# Patient Record
Sex: FEMALE | Race: WHITE | Employment: UNEMPLOYED | URBAN - METROPOLITAN AREA
[De-identification: names, ages, dates, MRNs, and addresses within clinical notes are randomized per-mention and may not be internally consistent; named-entity substitution may affect disease eponyms.]

---

## 2017-01-23 ENCOUNTER — OFFICE VISIT (OUTPATIENT)
Dept: INTERNAL MEDICINE | Facility: CLINIC | Age: 52
End: 2017-01-23
Payer: COMMERCIAL

## 2017-01-23 VITALS
SYSTOLIC BLOOD PRESSURE: 122 MMHG | TEMPERATURE: 97.9 F | DIASTOLIC BLOOD PRESSURE: 78 MMHG | HEART RATE: 91 BPM | WEIGHT: 167.4 LBS | BODY MASS INDEX: 35.14 KG/M2 | HEIGHT: 58 IN | OXYGEN SATURATION: 98 %

## 2017-01-23 DIAGNOSIS — M54.9 UPPER BACK PAIN: ICD-10-CM

## 2017-01-23 DIAGNOSIS — R73.09 ELEVATED GLUCOSE LEVEL: ICD-10-CM

## 2017-01-23 DIAGNOSIS — R79.89 ELEVATED LFTS: Primary | ICD-10-CM

## 2017-01-23 DIAGNOSIS — E78.5 HYPERLIPIDEMIA LDL GOAL <130: ICD-10-CM

## 2017-01-23 DIAGNOSIS — Z13.29 SCREENING FOR THYROID DISORDER: ICD-10-CM

## 2017-01-23 DIAGNOSIS — D17.30 LIPOMA OF SKIN AND SUBCUTANEOUS TISSUE: ICD-10-CM

## 2017-01-23 DIAGNOSIS — E66.9 OBESITY (BMI 30-39.9): ICD-10-CM

## 2017-01-23 LAB
ALBUMIN SERPL-MCNC: 3.7 G/DL (ref 3.4–5)
ALP SERPL-CCNC: 121 U/L (ref 40–150)
ALT SERPL W P-5'-P-CCNC: 94 U/L (ref 0–50)
ANION GAP SERPL CALCULATED.3IONS-SCNC: 8 MMOL/L (ref 3–14)
AST SERPL W P-5'-P-CCNC: 66 U/L (ref 0–45)
BASOPHILS # BLD AUTO: 0 10E9/L (ref 0–0.2)
BASOPHILS NFR BLD AUTO: 0.3 %
BILIRUB SERPL-MCNC: 0.2 MG/DL (ref 0.2–1.3)
BUN SERPL-MCNC: 11 MG/DL (ref 7–30)
CALCIUM SERPL-MCNC: 8.8 MG/DL (ref 8.5–10.1)
CHLORIDE SERPL-SCNC: 106 MMOL/L (ref 94–109)
CO2 SERPL-SCNC: 27 MMOL/L (ref 20–32)
CREAT SERPL-MCNC: 0.58 MG/DL (ref 0.52–1.04)
DIFFERENTIAL METHOD BLD: NORMAL
EOSINOPHIL # BLD AUTO: 0.2 10E9/L (ref 0–0.7)
EOSINOPHIL NFR BLD AUTO: 2.9 %
ERYTHROCYTE [DISTWIDTH] IN BLOOD BY AUTOMATED COUNT: 13.3 % (ref 10–15)
FERRITIN SERPL-MCNC: 230 NG/ML (ref 8–252)
GFR SERPL CREATININE-BSD FRML MDRD: ABNORMAL ML/MIN/1.7M2
GLUCOSE SERPL-MCNC: 127 MG/DL (ref 70–99)
HCT VFR BLD AUTO: 39.8 % (ref 35–47)
HGB BLD-MCNC: 13.1 G/DL (ref 11.7–15.7)
LIPASE SERPL-CCNC: 193 U/L (ref 73–393)
LYMPHOCYTES # BLD AUTO: 2.5 10E9/L (ref 0.8–5.3)
LYMPHOCYTES NFR BLD AUTO: 37.5 %
MCH RBC QN AUTO: 27.3 PG (ref 26.5–33)
MCHC RBC AUTO-ENTMCNC: 32.9 G/DL (ref 31.5–36.5)
MCV RBC AUTO: 83 FL (ref 78–100)
MONOCYTES # BLD AUTO: 0.4 10E9/L (ref 0–1.3)
MONOCYTES NFR BLD AUTO: 6.4 %
NEUTROPHILS # BLD AUTO: 3.5 10E9/L (ref 1.6–8.3)
NEUTROPHILS NFR BLD AUTO: 52.9 %
PLATELET # BLD AUTO: 270 10E9/L (ref 150–450)
POTASSIUM SERPL-SCNC: 3.6 MMOL/L (ref 3.4–5.3)
PROT SERPL-MCNC: 7.9 G/DL (ref 6.8–8.8)
RBC # BLD AUTO: 4.79 10E12/L (ref 3.8–5.2)
SODIUM SERPL-SCNC: 141 MMOL/L (ref 133–144)
TSH SERPL DL<=0.005 MIU/L-ACNC: 2.57 MU/L (ref 0.4–4)
WBC # BLD AUTO: 6.5 10E9/L (ref 4–11)

## 2017-01-23 PROCEDURE — 80050 GENERAL HEALTH PANEL: CPT | Performed by: INTERNAL MEDICINE

## 2017-01-23 PROCEDURE — 87340 HEPATITIS B SURFACE AG IA: CPT | Performed by: INTERNAL MEDICINE

## 2017-01-23 PROCEDURE — 82728 ASSAY OF FERRITIN: CPT | Performed by: INTERNAL MEDICINE

## 2017-01-23 PROCEDURE — 83690 ASSAY OF LIPASE: CPT | Performed by: INTERNAL MEDICINE

## 2017-01-23 PROCEDURE — 36415 COLL VENOUS BLD VENIPUNCTURE: CPT | Performed by: INTERNAL MEDICINE

## 2017-01-23 PROCEDURE — 86803 HEPATITIS C AB TEST: CPT | Performed by: INTERNAL MEDICINE

## 2017-01-23 PROCEDURE — 99214 OFFICE O/P EST MOD 30 MIN: CPT | Performed by: INTERNAL MEDICINE

## 2017-01-23 NOTE — Clinical Note
[facility][1]  [address]  [address]  [phone]          Randa Islastishoskar  [address]  [facility] [address]      1/25/2017        Dear Ms. Randa Castrejon:    I am writing to inform you of the results of the laboratory tests you had done recently.    The results of your recent labs are as noted.   Liver function: ABNORMAL liver ezymes, but better than last time.    Kidney  function: NORMAL  Hemoglobin: NORMAL  Electrolytes: NORMAL  Glucose: ABNORMAL @ 125 (this would be normal if you had eaten any foods within 4 hours of the lab test)  Hepatitis B:  NEGATIVE  Hepatitis C: NEGATIVE    The liver enzyme tests were still slightly elevated, but not as high as before.  The remainder of the liver tests were normal.  There was no evidence for Hepatitis B or Hepatitis C.  I am suspect that this liver enzyme elevation is due to dietary causes, specifically caused by intake of too many carbohydrates, like any foods made from flour (Bread, tortilla, pasta, noodles), white rice, potatoes, snack foods, regular soda, juices (except fresh squeezed), cakes, cookies, candy, etc.)   The glucose test was also slightly elevated, but any food within 4 hours of the blood test could cause this.   The pancreas, thyroid, kidneys, and blood counts were all OK.   The only thing you need to do is to reduce the intake of carbohydrates as we discussed in your appointment.  This will help lower glucose levels, reduce weight, and  Help improve the liver tests.     Return to see me in approximately summer 2017, sooner if needed.  Please get [facility] 1-2 days before this appointment.  Eat nothing for at least 8 hours prior to having these labs drawn.  Call [phone] to schedule both appointments.       Thank you for allowing me to participate in your care. If you have any further questions or problems, please contact me via our nurse line at  [phone].    Sincerely,          Des Aquino M.D.  Department of Internal Medicine  [facility]

## 2017-01-23 NOTE — PROGRESS NOTES
SUBJECTIVE:                                                    Randa Castrejon is a 51 year old female who presents to clinic today for the following health issues:      The patient does not speak English as their primary Language.  The patient was seen today via an interpretor.       1.  F/u liver labs, abnoremal LFTS last year.   deneis alcohol use.   Does admit to eating a fair amount of carbs in her diet.     Concern - Fall     Onset: 5 days ago     Description:   Pt fell on ice and hit head, bump on head near R temple    Intensity: mild    Progression of Symptoms:  improving    Accompanying Signs & Symptoms:  Pt still has some pain in back occ.        Previous history of similar problem:       Precipitating factors:   Worsened by:     Alleviating factors:  Improved by: massage, and ice       Therapies Tried and outcome: ice and massage w/ relief          Problem list and histories reviewed & adjusted, as indicated.  Additional history: as documented        Past Medical History:  ---------------------------  Past Medical History   Diagnosis Date     Obesity 3/9/15     BMI 34     Hyperlipidemia LDL goal <130 3/9/15     mild triglyceride and LDL elevation       Past Surgical History:  ---------------------------  Past Surgical History   Procedure Laterality Date     Release carpal tunnel  2004       Current Medications:  ---------------------------  No current outpatient prescriptions on file.       Allergies:  -------------  Allergies   Allergen Reactions     Advil [Ibuprofen] Nausea     Numbness in face      No Clinical Screening - See Comments        dye      Sulfa Drugs Rash     Numbness in face        Social History:  -------------------  Social History     Social History     Marital Status:      Spouse Name: N/A     Number of Children: N/A     Years of Education: N/A     Occupational History     Not on file.     Social History Main Topics     Smoking status: Never Smoker      Smokeless  "tobacco: Never Used     Alcohol Use: No     Drug Use: No     Sexual Activity:     Partners: Male     Other Topics Concern     Not on file     Social History Narrative       Family Medical History:  ------------------------------  Family History   Problem Relation Age of Onset     DIABETES Mother      Hypertension Mother      DIABETES Father      DIABETES Sister      Hypertension Sister      Hypertension Son          ROS:  REVIEW OF SYSTEMS:    RESP: negative for cough, dyspnea, wheezing, hemoptysis  CV: negative for chest pain, palpitations, PND, LE, orthopnea; reports no changes in their ability to perform physical activity (from cardiovascular standpoint)  GI: negative for dysphagia, N/V, pain, melena, diarrhea and constipation  NEURO: negative for numbness/tingling, paralysis, incoordination, or focal weakness     OBJECTIVE:                                                    /78 mmHg  Pulse 91  Temp(Src) 97.9  F (36.6  C) (Oral)  Ht 4' 9.5\" (1.461 m)  Wt 167 lb 6.4 oz (75.932 kg)  BMI 35.57 kg/m2  SpO2 98%     GENERAL alert and no distress  EYES:  Normal sclera,conjunctiva, EOMI  HENT: oral and posterior pharynx without lesions or erythema, facies symmetric  NECK: Neck supple. No LAD, without thyroidmegaly or JVD., Carotids without bruits.  RESP: Clear to ausculation bilaterally without wheezes or crackles. Normal BS in all fields.  CV: RRR normal S1S2 without murmurs, rubs or gallops. PMI normal  LYMPH: no cervical lymph adenopathy appreciated  MS: extremities- no gross deformities of the visible extremities noted, no edema  PSYCH: Alert and oriented times 3; speech- coherent  BACK:  Pain to palpation of the muscle of the upper back, particularly the rhomboids.  The muscles in the upper and mid back are in spasm and tight.  Palpation here exactly reproduces their pain.  No pain palpation of the vertebral bodies themselves.   SKIN:  No obvious significant skin lesions on visible portions of face, " rubbery mobiel soft tissue mass in soft tissues on forehead         ASSESSMENT/PLAN:                                                      (R79.89) Elevated LFTs  (primary encounter diagnosis)  Comment: The patient's liver function tests are higher than expected.    Discussed reasons for elevated LFTs including side effects from medications (e.g. Statins, tylenol, some anti seizure medications, allopurinol, etc.), alcoholic hepatitis, other street drugs, viral hepatitis, other non hepatic vial infections (e.g. Mononucleosis), cirrhosis, metabolic accumulative disorders (e.g. Hemochromatosis), or metabolic disorders (e.g. KOENIG).    Discussed next level testing and possible imaging.   Recommended that they stop any and all alcohol use and to avoid Tylenol as much as possible.     Plan: CBC with platelets and differential,         Comprehensive metabolic panel (BMP + Alb, Alk         Phos, ALT, AST, Total. Bili, TP), Lipase            (M54.9) Upper back pain  Comment: Upper back pain with fair amount of myofascial pain.  No evidence for spinal or vertebral pathology.  No imaging indicated at this time.   Discussed the typical causes for these pains and recommended proper ergonomics and body mechanical issues.   Recommended moist heat, NSAIDs if able to tolerate, stretching, massage/trigger point release, and physical therapy if the patient desires.  Told the patient to return if there are any changes in the symptoms worsen, change in any way, or fail to respond to these conservative measures.     Plan:     (Z13.29) Screening for thyroid disorder  Comment:   Plan: TSH with free T4 reflex              See Patient Instructions    *  See Dermatology about the fatty mass in the right forehead, which is probably just a simple lipoma, a bening soft tissue fatty mass.      *  Last year, the liver tests were very mildly elevated, but not enough to be of concern.  The most common reasons for this would be any alcohol and/or  tylenol consumed within 48 hours of the lab draw, certain anti-inflammatory medications (such as Aleve), statin cholesterol lowering medications, overweight/obesity, high carbohydrate diets, and also sometimes as a response to certain upper respiratory viral infections.  These liver tests would be expected to normalize if it was due to alcohol, tylenol, or Aleve; no further workup required at this time.  That being said, please be sure to avoid excessive alcohol consumption (no more than 2 drinks per day) and keep acetaminophen (Tylenol) use within recommended guidelines as listed on the bottle.     *  Rechecking the liver test today.  i will let you know if we have to do anything more than watch what you eat.     *  Return to see me in 6 months, sooner if needed.  Please get fasting labs done at the [facility] or any other [facility] lab 1-2 days before this appointment.  If you get the labs done at another clinic, make arrangements with them directly.  The orders will be in place.  Eat nothing for at least 8 hours prior to having these labs drawn.  Call [phone] to schedule appointments at [facility].       ADRIEL VASQUEZ M.D., MD  [facility]

## 2017-01-23 NOTE — MR AVS SNAPSHOT
After Visit Summary   1/23/2017    Randa Castrejon    MRN: [id number]           Patient Information     Date Of Birth          1965        Visit Information        Provider Department      1/23/2017 1:15 PM Des Aquino MD; [employer] [facility]        Today's Diagnoses     Elevated LFTs    -  1     Upper back pain         Screening for thyroid disorder         Lipoma of skin and subcutaneous tissue         Elevated glucose level         Obesity (BMI 30-39.9)         Hyperlipidemia LDL goal <130           Care Instructions    *  See Dermatology about the fatty mass in the right forehead, which is probably just a simple lipoma, a bening soft tissue fatty mass.      *  Last year, the liver tests were very mildly elevated, but not enough to be of concern.  The most common reasons for this would be any alcohol and/or tylenol consumed within 48 hours of the lab draw, certain anti-inflammatory medications (such as Aleve), statin cholesterol lowering medications, overweight/obesity, high carbohydrate diets, and also sometimes as a response to certain upper respiratory viral infections.  These liver tests would be expected to normalize if it was due to alcohol, tylenol, or Aleve; no further workup required at this time.  That being said, please be sure to avoid excessive alcohol consumption (no more than 2 drinks per day) and keep acetaminophen (Tylenol) use within recommended guidelines as listed on the bottle.     *  Rechecking the liver test today.  i will let you know if we have to do anything more than watch what you eat.     *  Return to see me in 6 months, sooner if needed.  Please get fasting labs done at the [facility] or any other [facility] lab 1-2 days before this appointment.  If you get the labs done at another clinic, make arrangements with them directly.  The orders will be in place.  Eat nothing for at  "least 8 hours prior to having these labs drawn.  Call [phone] to schedule appointments at [facility].       PLANTAR FASCIITIS:     *  Irritation/inflammation of the tendons of the arch of your foot at the insertion on to the insertion on your heel.      *  This is a problem of improper foot mechanics. Correcting these mechanical features will help.    *  Avoid any shoes that cause  pain or problems.  *  Never walk around in bare feet.    *  Over the counter orthotics/heel lifts/arch supports.  *  Custom orthotics can be made at an orthotic lab if needed, but these can be expensive.  Call me if you would like a referral for this.   *  Stretch the achilles tendons, plantar fascia many times per day.  *  Use Ibuprofen or similar products as needed.  *  Massage the plantar fascia with a golf ball or similar device, or thumbs before getting out of bed.  *  Plantar fascia program through [facility] if you desire, call me if you desire a referral here  *  Also go to www.tptherapy.com and consider some of the massage tools for the feet/lower extremities to help work on the soft tissues that can relieve the symptoms of plantar fasciitis.         *  \"Jack \" massage tool (or similar product) to massage the arch of your foot to break up the inflammatory tissue, especially in the morning.  (Google \"jack  massage tool or visit the web site:  http://www.[employer]/products/Massage-Tool/Pressure-Positive-Jack--II/765/1/1)               --Good Grains:  Oats, brown rice, Quinoa (these do not raise the blood sugar as much)     --Bad grains:  Anything made from wheat or white rice     (because these raise the blood sugars significantly, and the possible gluten issue from wheat for some people).      --Proteins:  Aim for \"lean proteins\" including chicken, fish, seafood, pork, turkey, and eggs (in moderation); Eat red meat only occasionally          *  OBESITY/WEIGHT " "LOSS:  ====================================================    *  Obesity is a major problem in this country.  Over 2/3 of adult Americans are overweight (BMI Over 25), and 1/3 are obese (BMI over 30)    *  Obesity is the leading cause of diabetes type II, which currently affects 1 out of 10 adult Americans and is projected to potentially affect 1 out of 3 adult Americans by 2040    *  Chronic obesity leads to \"insulin resistance\" which affects the carbohydrate (sugar) metabolism causing \"diabetisy\" which leads to type II Diabetes, increased visceral fat, a chronic low grade inflammatory state that leads to higher rates of vascular disease among other things.     *  Obesity is defined as BMI (body mass index) greater than 30.    *  Morbid obesity is defined as BMI over 40    Your most recent Body mass index is:  Body mass index is 35.57 kg/(m^2).    The main principles in weight loss are diet and exercise. You need to have both.      Dietary principles are aimed at keeping the fat content in your diet to less than 30% of total calories AND minimizing the simple carbohydrates (breads, sugars, pasta, etc.).  Complex carbohydrates such as wild rice, brown rice, legumes and most vegetables are OK.    *  Think \"Eat like a caveman\", eat \"primitive\" foods.    *  Keep your food shopping to the outside of the grocery store, do not eat foods that come from a bag or box, do not eat foods with bar codes.    *  Use the fork rule for all eating. [If you can't pick food up with a fork, then the food will not turn off hunger very well. Using this rule helps patients avoid choosing the wrong types of food, especially if you have had a bariatric surgery operation.   The \"wrong foods\" include liquid calories such as soup, juice, soda, slimfast, ice cream, and beer, crumbly foods such as chips, crackers, and cookies, and starch based foods such as pasta, too much rice, and too much bread.]     * Keep your calorie intake at least less " "than 1500 per day to start (under 1300 total if you want to be more aggressive), divided between 3 meals (try to have no meal more than 500 calories) and 2 snacks.      *  \"Learn what 500 calories looks like\" and try to keep all meals under 500 calories.      *  Drink one large glass of water BEFORE each meal.  This not only helps guard against dehydration, but it also helps provide additional \"fullness\" that will help reduce the amount of food that you will consume in a given meal by helping you fill up earlier.      *  Figure out what kind of calories you are taking in now at this time.  It is hard to change behaviors that you do not understand.      *  Eat breakfast every day.  Skipping meals has been shown to be one of the factors that correlates the highest with obesity, (even more than fast food).  Try to avoid the typical carbohydrates and sugars that dominate the typical American breakfast.  Try to get more protein in earlier in the day, this will make you less hungry later.       *  Try High Protein Ensure or Boost nutritional supplements (or similar versions) for breakfast if nothing else.      *  Avoid \"manufactured foods\" as much as possible.  My definition of a \"manufactured\" or \"processed\" food is any single food product that has more than 6 ingredients.     *  Keep your grocery shopping to the \"outside\" of the grocery store, this is where all the \"real food\" is located.      *  Try to limit all simple carbohydrate foods such as white breads (whole grain natural breads are OK), white rice (brown rice and wild rice are OK), pasta, noodles, \"snack foods\", candy, jam/jellies, cakes, pies, sweets, regular soda (sugar free is OK).    *  Limit the amount of citrus fruits such as oranges, julia, grapefruit.  These contain a large amount of sugars and will raise your blood sugars very high.  Try to keep less than 3 pieces of fruit per day.  Grapefruits specifically can interfere with the metabolism of " "\"statin\" cholesterol lowering drugs and therefore should be avoided completely if you are on a statin medication.      *  Always eat three meals a day every day:  breakfast, lunch, and supper.    *  I do not recommend over the counter diet aids such as Metabolife or Dexatrim since they have a high risk of side effects mainly fast heart rate, insomnia, and nervousness.    *  Very hard to lose weight with diet changes alone.  You need to have regular exercise.  You should aim for either 3 hours per week total of cumulative physical aerobic activity or 10,000 steps per day of activity.  Buy a pedometer and keep track of your activity.  If your typical daily activity does not add up to 10,000 total steps, then make up the difference with walking or some sort of aerobic activity.          Good resources for nutrition are:    ---> \"Wheat Belly\" by Quinten Samuel  (a book about the many bad things processed wheat products (i.e. Bread) have caused in our country...which I believe has serious merit)       --->  \"The Paleo Diet\"  By Alissa Douglas.             --->\"In Defense of Food\"  Of \"Food Rules\" (Gregorio Starkey) a book that goes into the causes obesity epidemic in our country    Augustin Starkey:                    ---> \"Picture Perfect Weight Loss\" by Clemente Webster (another good book about choices)        ---> \"Eat This, Not That\" Series,  by Brodie Torrez  (a book about food choices in the modern world)              ---> \"The Blood Sugar Solution\" by Davon Tellez ( a book about obesity and insulin resistance leading to \"diabesity\")           Fauzia Arias ( a guidebook for counting calories, and knowing the components of the food that you eat)       \"The Best Life Diet\"  (a complete diet program)             Aim for a Healthy Weight Web Page at www.nhlbi.nih.gov       [place] Diet       Liborio Brambila:  \"Eat More, Weigh Less\" or \"The Program for the Reversal of Heart Disease\"       [facility] web site  the food and " nutrition link.       American Heart Association       American Diabetes Association (www.diabetes.org)                         Follow-ups after your visit        Additional Services     DERMATOLOGY REFERRAL       Your provider has referred you to: KEHINDE: [facility] [phone]   [email or web address]    Please be aware that coverage of these services is subject to the terms and limitations of your health insurance plan.  Call member services at your health plan with any benefit or coverage questions.      Please bring the following with you to your appointment:    (1) Any X-Rays, CTs or MRIs which have been performed.  Contact the facility where they were done to arrange for  prior to your scheduled appointment.    (2) List of current medications  (3) This referral request   (4) Any documents/labs given to you for this referral                  Future tests that were ordered for you today     Open Future Orders        Priority Expected Expires Ordered    Comprehensive metabolic panel (BMP + Alb, Alk Phos, ALT, AST, Total. Bili, TP) Routine 7/23/2017 9/23/2017 1/23/2017    Hemoglobin A1c Routine 7/23/2017 9/23/2017 1/23/2017    Lipid Profile with reflex to direct LDL Routine 7/23/2017 9/23/2017 1/23/2017            Who to contact     If you have questions or need follow up information about today's clinic visit or your schedule please contact [facility] directly at [phone].  Normal or non-critical lab and imaging results will be communicated to you by MyChart, letter or phone within 4 business days after the clinic has received the results. If you do not hear from us within 7 days, please contact the clinic through MyChart or phone. If you have a critical or abnormal lab result, we will notify you by phone as soon as possible.  Submit refill requests through [facility] or call your pharmacy and they will forward the refill  "request to us. Please allow 3 business days for your refill to be completed.          Additional Information About Your Visit        [employer] Information     [employer] gives you secure access to your electronic health record. If you see a primary care provider, you can also send messages to your care team and make appointments. If you have questions, please call your primary care clinic.  If you do not have a primary care provider, please call [phone] and they will assist you.        Care EveryWhere ID     This is your Care EveryWhere ID. This could be used by other organizations to access your [place] medical records  [id number]        Your Vitals Were     Pulse Temperature Height BMI (Body Mass Index) Pulse Oximetry       91 97.9  F (36.6  C) (Oral) 4' 9.5\" (1.461 m) 35.57 kg/m2 98%        Blood Pressure from Last 3 Encounters:   01/23/17 122/78   05/23/16 140/76   05/06/16 108/80    Weight from Last 3 Encounters:   01/23/17 167 lb 6.4 oz (75.932 kg)   05/23/16 169 lb 8 oz (76.885 kg)   05/06/16 164 lb (74.39 kg)              We Performed the Following     CBC with platelets and differential     Comprehensive metabolic panel (BMP + Alb, Alk Phos, ALT, AST, Total. Bili, TP)     DERMATOLOGY REFERRAL     Ferritin     Hepatitis B surface antigen     Hepatitis C Screen Reflex to HCV RNA Quant and Genotype     Lipase     TSH with free T4 reflex        Primary Care Provider Office Phone # Fax #    Des Aquino [phone]       [facility] [address] [place] [id number]        Thank you!     Thank you for choosing [facility]  for your care. Our goal is always to provide you with excellent care. Hearing back from our patients is one way we can continue to improve our services. Please take a few minutes to complete the written survey that you may receive in the mail after your visit with us. Thank you!             Your Updated Medication List - " Protect others around you: Learn how to safely use, store and throw away your medicines at www.disposemymeds.org.      Notice  As of 1/23/2017  2:39 PM    You have not been prescribed any medications.

## 2017-01-23 NOTE — PATIENT INSTRUCTIONS
*  See Dermatology about the fatty mass in the right forehead, which is probably just a simple lipoma, a bening soft tissue fatty mass.      *  Last year, the liver tests were very mildly elevated, but not enough to be of concern.  The most common reasons for this would be any alcohol and/or tylenol consumed within 48 hours of the lab draw, certain anti-inflammatory medications (such as Aleve), statin cholesterol lowering medications, overweight/obesity, high carbohydrate diets, and also sometimes as a response to certain upper respiratory viral infections.  These liver tests would be expected to normalize if it was due to alcohol, tylenol, or Aleve; no further workup required at this time.  That being said, please be sure to avoid excessive alcohol consumption (no more than 2 drinks per day) and keep acetaminophen (Tylenol) use within recommended guidelines as listed on the bottle.     *  Rechecking the liver test today.  i will let you know if we have to do anything more than watch what you eat.     *  Return to see me in 6 months, sooner if needed.  Please get fasting labs done at the [facility] or any other [facility] lab 1-2 days before this appointment.  If you get the labs done at another clinic, make arrangements with them directly.  The orders will be in place.  Eat nothing for at least 8 hours prior to having these labs drawn.  Call [phone] to schedule appointments at [facility].       PLANTAR FASCIITIS:     *  Irritation/inflammation of the tendons of the arch of your foot at the insertion on to the insertion on your heel.      *  This is a problem of improper foot mechanics. Correcting these mechanical features will help.    *  Avoid any shoes that cause  pain or problems.  *  Never walk around in bare feet.    *  Over the counter orthotics/heel lifts/arch supports.  *  Custom orthotics can be made at an orthotic lab if needed, but these can be expensive.  Call me if you  "would like a referral for this.   *  Stretch the achilles tendons, plantar fascia many times per day.  *  Use Ibuprofen or similar products as needed.  *  Massage the plantar fascia with a golf ball or similar device, or thumbs before getting out of bed.  *  Plantar fascia program through [facility] if you desire, call me if you desire a referral here  *  Also go to www.tptherapy.com and consider some of the massage tools for the feet/lower extremities to help work on the soft tissues that can relieve the symptoms of plantar fasciitis.         *  \"Jack \" massage tool (or similar product) to massage the arch of your foot to break up the inflammatory tissue, especially in the morning.  (Google \"jack  massage tool or visit the web site:  http://www.[employer]/products/Massage-Tool/Pressure-Positive-Jack--II/765/1/1)               --Good Grains:  Oats, brown rice, Quinoa (these do not raise the blood sugar as much)     --Bad grains:  Anything made from wheat or white rice     (because these raise the blood sugars significantly, and the possible gluten issue from wheat for some people).      --Proteins:  Aim for \"lean proteins\" including chicken, fish, seafood, pork, turkey, and eggs (in moderation); Eat red meat only occasionally          *  OBESITY/WEIGHT LOSS:  ====================================================    *  Obesity is a major problem in this [place].  Over 2/3 of adult Americans are overweight (BMI Over 25), and 1/3 are obese (BMI over 30)    *  Obesity is the leading cause of diabetes type II, which currently affects 1 out of 10 adult Americans and is projected to potentially affect 1 out of 3 adult Americans by 2040    *  Chronic obesity leads to \"insulin resistance\" which affects the carbohydrate (sugar) metabolism causing \"diabetisy\" which leads to type II Diabetes, increased visceral fat, a chronic low grade inflammatory state that leads to higher rates of " "vascular disease among other things.     *  Obesity is defined as BMI (body mass index) greater than 30.    *  Morbid obesity is defined as BMI over 40    Your most recent Body mass index is:  Body mass index is 35.57 kg/(m^2).    The main principles in weight loss are diet and exercise. You need to have both.      Dietary principles are aimed at keeping the fat content in your diet to less than 30% of total calories AND minimizing the simple carbohydrates (breads, sugars, pasta, etc.).  Complex carbohydrates such as wild rice, brown rice, legumes and most vegetables are OK.    *  Think \"Eat like a caveman\", eat \"primitive\" foods.    *  Keep your food shopping to the outside of the grocery store, do not eat foods that come from a bag or box, do not eat foods with bar codes.    *  Use the fork rule for all eating. [If you can't pick food up with a fork, then the food will not turn off hunger very well. Using this rule helps patients avoid choosing the wrong types of food, especially if you have had a bariatric surgery operation.   The \"wrong foods\" include liquid calories such as soup, juice, soda, slimfast, ice cream, and beer, crumbly foods such as chips, crackers, and cookies, and starch based foods such as pasta, too much rice, and too much bread.]     * Keep your calorie intake at least less than 1500 per day to start (under 1300 total if you want to be more aggressive), divided between 3 meals (try to have no meal more than 500 calories) and 2 snacks.      *  \"Learn what 500 calories looks like\" and try to keep all meals under 500 calories.      *  Drink one large glass of water BEFORE each meal.  This not only helps guard against dehydration, but it also helps provide additional \"fullness\" that will help reduce the amount of food that you will consume in a given meal by helping you fill up earlier.      *  Figure out what kind of calories you are taking in now at this time.  It is hard to change behaviors that " "you do not understand.      *  Eat breakfast every day.  Skipping meals has been shown to be one of the factors that correlates the highest with obesity, (even more than fast food).  Try to avoid the typical carbohydrates and sugars that dominate the typical American breakfast.  Try to get more protein in earlier in the day, this will make you less hungry later.       *  Try High Protein Ensure or Boost nutritional supplements (or similar versions) for breakfast if nothing else.      *  Avoid \"manufactured foods\" as much as possible.  My definition of a \"manufactured\" or \"processed\" food is any single food product that has more than 6 ingredients.     *  Keep your grocery shopping to the \"outside\" of the grocery store, this is where all the \"real food\" is located.      *  Try to limit all simple carbohydrate foods such as white breads (whole grain natural breads are OK), white rice (brown rice and wild rice are OK), pasta, noodles, \"snack foods\", candy, jam/jellies, cakes, pies, sweets, regular soda (sugar free is OK).    *  Limit the amount of citrus fruits such as oranges, julia, grapefruit.  These contain a large amount of sugars and will raise your blood sugars very high.  Try to keep less than 3 pieces of fruit per day.  Grapefruits specifically can interfere with the metabolism of \"statin\" cholesterol lowering drugs and therefore should be avoided completely if you are on a statin medication.      *  Always eat three meals a day every day:  breakfast, lunch, and supper.    *  I do not recommend over the counter diet aids such as Metabolife or Dexatrim since they have a high risk of side effects mainly fast heart rate, insomnia, and nervousness.    *  Very hard to lose weight with diet changes alone.  You need to have regular exercise.  You should aim for either 3 hours per week total of cumulative physical aerobic activity or 10,000 steps per day of activity.  Buy a pedometer and keep track of your activity.  " "If your typical daily activity does not add up to 10,000 total steps, then make up the difference with walking or some sort of aerobic activity.          Good resources for nutrition are:    ---> \"Wheat Belly\" by Quinten Samuel  (a book about the many bad things processed wheat products (i.e. Bread) have caused in our country...which I believe has serious merit)       --->  \"The Paleo Diet\"  By Alissa Douglas.             --->\"In Defense of Food\"  Of \"Food Rules\" (Gregorio Starkey) a book that goes into the causes obesity epidemic in our country    Augustin Starkey:                    ---> \"Picture Perfect Weight Loss\" by Clemente Webster (another good book about choices)        ---> \"Eat This, Not That\" Series,  by Brodie Torrez  (a book about food choices in the modern world)              ---> \"The Blood Sugar Solution\" by Davon Tellez ( a book about obesity and insulin resistance leading to \"diabesity\")           Fauzia Arias ( a guidebook for counting calories, and knowing the components of the food that you eat)       \"The Best Life Diet\"  (a complete diet program)             Aim for a Healthy Weight Web Page at www.nhlbi.nih.gov       [place] Diet       Liborio Brambila:  \"Eat More, Weigh Less\" or \"The Program for the Reversal of Heart Disease\"       [facility] web site  the food and nutrition link.       American Heart Association       American Diabetes Association (www.diabetes.org)                   "

## 2017-01-23 NOTE — NURSING NOTE
"Chief Complaint   Patient presents with     Liver     f/u w/ labs     Fall     fell and hit head 5 fays ago       Initial /78 mmHg  Pulse 91  Temp(Src) 97.9  F (36.6  C) (Oral)  Ht 4' 9.5\" (1.461 m)  Wt 167 lb 6.4 oz (75.932 kg)  BMI 35.57 kg/m2  SpO2 98% Estimated body mass index is 35.57 kg/(m^2) as calculated from the following:    Height as of this encounter: 4' 9.5\" (1.461 m).    Weight as of this encounter: 167 lb 6.4 oz (75.932 kg).  BP completed using cuff size: desiree Pelletier CMA      "

## 2017-01-24 LAB
HBV SURFACE AG SERPL QL IA: NONREACTIVE
HCV AB SERPL QL IA: NORMAL

## 2017-05-30 ENCOUNTER — OFFICE VISIT (OUTPATIENT)
Dept: INTERNAL MEDICINE | Facility: CLINIC | Age: 52
End: 2017-05-30
Payer: COMMERCIAL

## 2017-05-30 VITALS
OXYGEN SATURATION: 99 % | SYSTOLIC BLOOD PRESSURE: 114 MMHG | DIASTOLIC BLOOD PRESSURE: 68 MMHG | HEIGHT: 58 IN | HEART RATE: 60 BPM | WEIGHT: 163 LBS | TEMPERATURE: 98.2 F | BODY MASS INDEX: 34.22 KG/M2

## 2017-05-30 DIAGNOSIS — E78.5 HYPERLIPIDEMIA LDL GOAL <130: ICD-10-CM

## 2017-05-30 DIAGNOSIS — Z13.29 SCREENING FOR THYROID DISORDER: ICD-10-CM

## 2017-05-30 DIAGNOSIS — E66.9 OBESITY (BMI 30-39.9): ICD-10-CM

## 2017-05-30 DIAGNOSIS — R73.09 ELEVATED GLUCOSE LEVEL: ICD-10-CM

## 2017-05-30 DIAGNOSIS — Z12.11 SCREENING FOR COLON CANCER: ICD-10-CM

## 2017-05-30 DIAGNOSIS — R79.89 ELEVATED LFTS: ICD-10-CM

## 2017-05-30 DIAGNOSIS — Z00.00 ROUTINE GENERAL MEDICAL EXAMINATION AT A HEALTH CARE FACILITY: Primary | ICD-10-CM

## 2017-05-30 DIAGNOSIS — Z12.31 VISIT FOR SCREENING MAMMOGRAM: ICD-10-CM

## 2017-05-30 DIAGNOSIS — R73.03 PREDIABETES: ICD-10-CM

## 2017-05-30 LAB
ALBUMIN SERPL-MCNC: 3.8 G/DL (ref 3.4–5)
ALP SERPL-CCNC: 112 U/L (ref 40–150)
ALT SERPL W P-5'-P-CCNC: 41 U/L (ref 0–50)
ANION GAP SERPL CALCULATED.3IONS-SCNC: 7 MMOL/L (ref 3–14)
AST SERPL W P-5'-P-CCNC: 27 U/L (ref 0–45)
BASOPHILS # BLD AUTO: 0 10E9/L (ref 0–0.2)
BASOPHILS NFR BLD AUTO: 0.3 %
BILIRUB SERPL-MCNC: 0.3 MG/DL (ref 0.2–1.3)
BUN SERPL-MCNC: 9 MG/DL (ref 7–30)
CALCIUM SERPL-MCNC: 8.9 MG/DL (ref 8.5–10.1)
CHLORIDE SERPL-SCNC: 106 MMOL/L (ref 94–109)
CHOLEST SERPL-MCNC: 227 MG/DL
CO2 SERPL-SCNC: 27 MMOL/L (ref 20–32)
CREAT SERPL-MCNC: 0.56 MG/DL (ref 0.52–1.04)
DIFFERENTIAL METHOD BLD: NORMAL
EOSINOPHIL # BLD AUTO: 0.2 10E9/L (ref 0–0.7)
EOSINOPHIL NFR BLD AUTO: 2.1 %
ERYTHROCYTE [DISTWIDTH] IN BLOOD BY AUTOMATED COUNT: 13.2 % (ref 10–15)
GFR SERPL CREATININE-BSD FRML MDRD: ABNORMAL ML/MIN/1.7M2
GLUCOSE SERPL-MCNC: 113 MG/DL (ref 70–99)
HBA1C MFR BLD: 6.3 % (ref 4.3–6)
HCT VFR BLD AUTO: 39.9 % (ref 35–47)
HDLC SERPL-MCNC: 35 MG/DL
HGB BLD-MCNC: 12.8 G/DL (ref 11.7–15.7)
LDLC SERPL CALC-MCNC: 156 MG/DL
LYMPHOCYTES # BLD AUTO: 2.3 10E9/L (ref 0.8–5.3)
LYMPHOCYTES NFR BLD AUTO: 29.7 %
MCH RBC QN AUTO: 26.9 PG (ref 26.5–33)
MCHC RBC AUTO-ENTMCNC: 32.1 G/DL (ref 31.5–36.5)
MCV RBC AUTO: 84 FL (ref 78–100)
MONOCYTES # BLD AUTO: 0.4 10E9/L (ref 0–1.3)
MONOCYTES NFR BLD AUTO: 5.2 %
NEUTROPHILS # BLD AUTO: 4.9 10E9/L (ref 1.6–8.3)
NEUTROPHILS NFR BLD AUTO: 62.7 %
NONHDLC SERPL-MCNC: 192 MG/DL
PLATELET # BLD AUTO: 229 10E9/L (ref 150–450)
POTASSIUM SERPL-SCNC: 4.3 MMOL/L (ref 3.4–5.3)
PROT SERPL-MCNC: 7.7 G/DL (ref 6.8–8.8)
RBC # BLD AUTO: 4.75 10E12/L (ref 3.8–5.2)
SODIUM SERPL-SCNC: 140 MMOL/L (ref 133–144)
TRIGL SERPL-MCNC: 180 MG/DL
TSH SERPL DL<=0.005 MIU/L-ACNC: 2.85 MU/L (ref 0.4–4)
WBC # BLD AUTO: 7.7 10E9/L (ref 4–11)

## 2017-05-30 PROCEDURE — 80061 LIPID PANEL: CPT | Performed by: INTERNAL MEDICINE

## 2017-05-30 PROCEDURE — 83036 HEMOGLOBIN GLYCOSYLATED A1C: CPT | Performed by: INTERNAL MEDICINE

## 2017-05-30 PROCEDURE — 36415 COLL VENOUS BLD VENIPUNCTURE: CPT | Performed by: INTERNAL MEDICINE

## 2017-05-30 PROCEDURE — 99396 PREV VISIT EST AGE 40-64: CPT | Performed by: INTERNAL MEDICINE

## 2017-05-30 PROCEDURE — 80050 GENERAL HEALTH PANEL: CPT | Performed by: INTERNAL MEDICINE

## 2017-05-30 NOTE — NURSING NOTE
"Chief Complaint   Patient presents with     Physical     pt is fasting       Initial /68  Pulse 60  Temp 98.2  F (36.8  C) (Oral)  Ht 4' 9.5\" (1.461 m)  Wt 163 lb (73.9 kg)  SpO2 99%  BMI 34.66 kg/m2 Estimated body mass index is 34.66 kg/(m^2) as calculated from the following:    Height as of this encounter: 4' 9.5\" (1.461 m).    Weight as of this encounter: 163 lb (73.9 kg).  Medication Reconciliation: complete   Leah Pelletier CMA    "

## 2017-05-30 NOTE — PATIENT INSTRUCTIONS
"  *  Work on your diet.     *  Mammogram at you r convenience.     *  Colon cancer screening:     --I would recommend that you get a colonoscopy, thew best test for colon cancer screening.       --At the minimum, you should have a FIT test    Return the \"FIT\" stool card test to us via mail.  This is a basic level screening for colon cancer by detecting trace amount of occult blood in the intestinal tract.  My preference (and that of the American Cancer Society) would normally be for a full colonoscopy, but the FIT test can suffice for now.  Eventually you should have a colonoscopy.  If the FIT test shows any traces of occult blood, it does NOT necessarily mean that you have colon cancer, it just means that we should probably consider doing the colonoscopy.             Preventive Health Recommendations  Female Ages 50 - 64    Yearly exam: See your health care provider every year in order to  o Review health changes.   o Discuss preventive care.    o Review your medicines if your doctor has prescribed any.      Get a Pap test every three years (unless you have an abnormal result and your provider advises testing more often).    If you get Pap tests with HPV test, you only need to test every 5 years, unless you have an abnormal result.     You do not need a Pap test if your uterus was removed (hysterectomy) and you have not had cancer.    You should be tested each year for STDs (sexually transmitted diseases) if you're at risk.     Have a mammogram every 1 to 2 years.    Have a colonoscopy at age 50, or have a yearly FIT test (stool test). These exams screen for colon cancer.      Have a cholesterol test every 5 years, or more often if advised.    Have a diabetes test (fasting glucose) every three years. If you are at risk for diabetes, you should have this test more often.     If you are at risk for osteoporosis (brittle bone disease), think about having a bone density scan (DEXA).    Shots: Get a flu shot each year. " Get a tetanus shot every 10 years.    Nutrition:     Eat at least 5 servings of fruits and vegetables each day.    Eat whole-grain bread, whole-wheat pasta and brown rice instead of white grains and rice.    Talk to your provider about Calcium and Vitamin D.     Lifestyle    Exercise at least 150 minutes a week (30 minutes a day, 5 days a week). This will help you control your weight and prevent disease.    Limit alcohol to one drink per day.    No smoking.     Wear sunscreen to prevent skin cancer.     See your dentist every six months for an exam and cleaning.    See your eye doctor every 1 to 2 years.

## 2017-05-30 NOTE — PROGRESS NOTES
SUBJECTIVE:     CC: Randa Castrejon is an 52 year old woman who presents for preventive health visit.     Healthy Habits:    Do you get at least three servings of calcium containing foods daily (dairy, green leafy vegetables, etc.)? sometimes    Amount of exercise or daily activities, outside of work: none    Problems taking medications regularly No    Medication side effects: No    Have you had an eye exam in the past two years? yes    Do you see a dentist twice per year? yes  Do you have sleep apnea, excessive snoring or daytime drowsiness?no      1.  states she has had abnormal liver function tests in the past, would like them checked today.  revioewed labs.   Hepatitis serologies negative  She denies any excesvei alcohol or tylenol use.     2.    The patient has had a history of ongoing obesity.  Reviewed the weigth curves.   Their current BMI is:  Body mass index is 34.66 kg/(m^2).  Reviewed previous attempts at weight loss which have not been successful in producing prolonged weigth loss.   Discussed current eating and exercise habits.     Reviewed weights in chart:  Wt Readings from Last 10 Encounters:   05/30/17 163 lb (73.9 kg)   01/23/17 167 lb 6.4 oz (75.9 kg)   05/23/16 169 lb 8 oz (76.9 kg)   05/06/16 164 lb (74.4 kg)   03/15/16 165 lb 11.2 oz (75.2 kg)   03/10/15 159 lb (72.1 kg)   07/09/14 153 lb 12.8 oz (69.8 kg)       3.  The patient has a history of impaired glucose tolerance with regularly elevated blood sugars.    They have not been diagnosed with type II DM or placed on medications for diabetes before.   They deny polyuria, polydipsia.     The patient is obese with a BMI of Body mass index is 34.66 kg/(m^2)..    Review of current labs show:    Lab Results   Component Value Date    A1C 6.3 05/30/2017     Lab Results   Component Value Date     05/30/2017     01/23/2017     05/06/2016    GLC 92 03/20/2015              Today's PHQ-2 Score:   PHQ-2 ( 1999 Pf[employer])  5/30/2017 1/23/2017   Q1: Little interest or pleasure in doing things 0 0   Q2: Feeling down, depressed or hopeless 0 0   PHQ-2 Score 0 0       Abuse: Current or Past(Physical, Sexual or Emotional)- No  Do you feel safe in your environment - Yes    Social History   Substance Use Topics     Smoking status: Never Smoker     Smokeless tobacco: Never Used     Alcohol use No     The patient does not drink >3 drinks per day nor >7 drinks per week.    Recent Labs   Lab Test  05/06/16   1052  03/20/15   1053   CHOL  215*  206*   HDL  34*  38*   LDL  135*  135*   TRIG  228*  166*   CHOLHDLRATIO   --   5.4*   NHDL  181*   --        Reviewed orders with patient.  Reviewed health maintenance and updated orders accordingly - Yes    Mammo Decision Support:  Patient over age 50, mutual decision to screen reflected in health maintenance.    Pertinent mammograms are reviewed under the imaging tab.  History of abnormal Pap smear: NO - age 30- 65 PAP every 3 years recommended    Reviewed and updated as needed this visit by clinical staff  Tobacco  Allergies         Reviewed and updated as needed this visit by Provider        Past Medical History:  ---------------------------  Past Medical History:   Diagnosis Date     Hyperlipidemia LDL goal <130 3/9/15    mild triglyceride and LDL elevation     Obesity (BMI 30.0-34.9) 03/09/2015    BMI 34       Past Surgical History:  ---------------------------  Past Surgical History:   Procedure Laterality Date     RELEASE CARPAL TUNNEL  2004       Current Medications:  ---------------------------  Current Outpatient Prescriptions   Medication Sig Dispense Refill     GLUCOSAMINE SULFATE PO          Allergies:  -------------  Allergies   Allergen Reactions     Advil [Ibuprofen] Nausea     Numbness in face      No Clinical Screening - See Comments        dye      Sulfa Drugs Rash     Numbness in face        Social History:  -------------------  Social History     Social History     Marital status:  "     Spouse name: N/A     Number of children: N/A     Years of education: N/A     Occupational History     Not on file.     Social History Main Topics     Smoking status: Never Smoker     Smokeless tobacco: Never Used     Alcohol use No     Drug use: No     Sexual activity: Yes     Partners: Male     Other Topics Concern     Not on file     Social History Narrative       Family Medical History:  ------------------------------  Family History   Problem Relation Age of Onset     DIABETES Mother      Hypertension Mother      DIABETES Father      DIABETES Sister      Hypertension Sister      Hypertension Son         ROS:  C: NEGATIVE for fever, chills, change in weight  I: NEGATIVE for worrisome rashes, moles or lesions  E: NEGATIVE for vision changes or irritation  ENT: NEGATIVE for ear, mouth and throat problems  R: NEGATIVE for significant cough or SOB  B: NEGATIVE for masses, tenderness or discharge  CV: NEGATIVE for chest pain, palpitations or peripheral edema  GI: NEGATIVE for nausea, abdominal pain, heartburn, or change in bowel habits  : NEGATIVE for unusual urinary or vaginal symptoms. No vaginal bleeding.  M: NEGATIVE for significant arthralgias or myalgia  N: NEGATIVE for weakness, dizziness or paresthesias  P: NEGATIVE for changes in mood or affect       OBJECTIVE:     /68  Pulse 60  Temp 98.2  F (36.8  C) (Oral)  Ht 4' 9.5\" (1.461 m)  Wt 163 lb (73.9 kg)  SpO2 99%  BMI 34.66 kg/m2  EXAM:  GENERAL alert and no distress.  EYES conjunctivae/corneas clear. PERRL, EOM's intact  HENT: NCAT,oral and posterior pharynx without lesions or erythema, facies symmetric  NECK: Neck supple. No LAD, without thyroidmegaly or JVD.  RESP: Clear to ausculation bilaterally without wheezes or crackles. Normal BS in all fields.  CV: RRR normal S1S2 without  murmurs, rubs or gallops. PMI normal  LYMPH: no cervical lymph adenopathy appreciated  GI: NTND, no organomegaly, normal BS in all quadrants, without " rebound or guarding  MS: No cyanosis, clubbing or edema noted bilaterally in Upper and/or Lower Extremities  SKIN: no significant ulcers, lesions or rashes on the visualized portions of the skin  NEURO: Alert and Oriented x 3, Gait normal. Reflexes normal and symmetric. Sensation grossly WNL..  PSYCH: Alert and oriented times 3; speech- coherent , normal rate and volume; able to articulate logical thoughts, able to abstract reason, no tangential thoughts, no hallucinations or delusions, affect- normal     ASSESSMENT/PLAN:       (Z00.00) Routine general medical examination at a health care facility  (primary encounter diagnosis)  Comment: Discussed self breast exam, schedule for mammogram.  Discussed importance of regular pelvic exams and PAP smears to check for GYN malignancies.  Discussed cardiac risk factor modification including screening for (and treating if present) cholesterol, HTN, DM, and avoiding smoking.  Recommended a low fat diet and regular aerobic activity.    Counseling:      ATP III Guidelines  ICSI Preventive Guidelines   Plan: CBC with platelets and differential            (R79.89) Elevated LFTs  Comment: recheck labs, suspect KOENIG  Plan: CBC with platelets and differential            (R73.09) Elevated glucose level  Comment: prob prediabetes.   Has strong family history of diabetes and obseity.   Spoke at great length ofd carbohjydrates and weight loss and diabetes.   Plan: CBC with platelets and differential,         Comprehensive metabolic panel (BMP + Alb, Alk         Phos, ALT, AST, Total. Bili, TP), Hemoglobin         A1c            (E66.9) Obesity (BMI 30-39.9)  Comment: The patient's current BMI is: Body mass index is 34.66 kg/(m^2).  Obesity is a biological preventable and treatable disease, which is associated with significantly increased risk of many acute and chronic health conditions. Obesity has now been recognized as a chronic disease by the American Medical Association.  A range of  serious co-morbidities are associated with obesity including increased risk for hypertension, stroke, coronary artery disease, dyslipidemia, Type II diabetes, depression, sleep apnea, cancers of the colon, breast and endometrium, obstructive sleep apnea, osteoarthritis and female infertility. Therefore, obesity is not just a problem; it s a disease that warrants serious evidence based treatements.  Recommended regular aerobic exercise, recommended improved diet aiming at lowering amount of processed foods, lower sugars, and lower wheat products, and moderation carbs and fat in the diet, establishing more regular meal times, always eating breakfast, front loading some of the calories and adding more protein to the diet.    Discussed the increased risks of developing or worsening all types of diabetes and other dysmetabolic syndromes.    Surgical therapy may be considered, especially in patients with BMI greater than or equal to 35 kg/m2 with multiple complications. Surgical treatments include lap-band, gastric sleeve or gastric bypass surgery.     Plan: CBC with platelets and differential            (E78.5) Hyperlipidemia LDL goal <130  Comment: Discussed new guidelines recommending a statin cholesterol lowering medication for any patient with either diabetes and/or vascular disease, aiming for a LDL goal under 100 for sure, ideally under 70.    Reviewed statins and their side effects including muscle pain, muscle inflammation, GI upset.  Told the patient to stop the medication in question and to call if any side effects develop.   Recommended CoQ10 200-300 mg at the same time as taking the statin medication to help reduce the chance of muscle side effects from the statin.    Plan: CBC with platelets and differential,         Comprehensive metabolic panel (BMP + Alb, Alk         Phos, ALT, AST, Total. Bili, TP), Lipid Profile        with reflex to direct LDL            (Z13.29) Screening for thyroid  "disorder  Comment:   Plan: CBC with platelets and differential, TSH with         free T4 reflex            (Z12.31) Visit for screening mammogram  Comment:   Plan: MA Screening Digital Bilateral            (Z12.11) Screening for colon cancer  Comment: I discussed current recommendations about colon cancer screening recommending colonoscopy as gold standard test, starting age 50 (age 40 for family history of colon cancer).  The patient is declining to do colonoscopy at this time.   They will agree to FIT testing annually.   Would reconsider colonoscopy if the FIT test is positive.      Patient just did not want to do a colonoscopy  Plan: Fecal colorectal cancer screen (FIT)            (R73.03) Prediabetes  Comment: Reviewed the labs showing elevated glucose levels.    Discussed \"pre-diabetes\", impaired glucose tolerance, and its part in the dysmetabolic syndrome.    Discussed the inevitable progression of impaired glucose tolerance toward worsening diabetes mellitus and the need for agressive interventions now to delay and prevent this inevitable progression.  Discussed the overall risks that dysmetabolic syndromes/impaired glucose tolerance/syndrome X pose toward increased risks of vascular disease as the main reason for agressive intervention now.  Will add medications for glucose control (i.e. metformin, glitazones, etc), lipid (e.g. statins), and for blood pressure (preferably ARBs and ACE) as indicated.  Will start these as early as needed based other proven ability to delay and modify these risk factors.   Discussed the need for aggressive diet control as the cornerstone of pre-diabetes and diabetes management, emphasizing the reduction of \"simple carbohydrates\" (e.g. Any kind of wheat products (e.g. any bread, any pasta), white rice, noodles, potatoes, snack foods, regular soda, juices (except fresh squeezed), cakes, cookies, candy, etc.) along with regular exercise.       Plan: Comprehensive metabolic panel " "(BMP + Alb, Alk         Phos, ALT, AST, Total. Bili, TP), Hemoglobin         A1c, Lipid Profile with reflex to direct LDL               COUNSELING:   Reviewed preventive health counseling, as reflected in patient instructions       Regular exercise       Healthy diet/nutrition       Vision screening       Hearing screening       Colon cancer screening         reports that she has never smoked. She has never used smokeless tobacco.    Estimated body mass index is 34.66 kg/(m^2) as calculated from the following:    Height as of this encounter: 4' 9.5\" (1.461 m).    Weight as of this encounter: 163 lb (73.9 kg).   Weight management plan: see above    Counseling Resources:  ATP IV Guidelines  Pooled Cohorts Equation Calculator  Breast Cancer Risk Calculator  FRAX Risk Assessment  ICSI Preventive Guidelines  Dietary Guidelines for Americans, 2010  USDA's MyPlate  ASA Prophylaxis  Lung CA Screening    Des Aquino MD  [facility]  "

## 2017-05-30 NOTE — MR AVS SNAPSHOT
"              After Visit Summary   5/30/2017    Randa Castrejon    MRN: [id number]           Patient Information     Date Of Birth          1965        Visit Information        Provider Department      5/30/2017 8:15 AM Des Aquino MD; [place] [facility]        Today's Diagnoses     Routine general medical examination at a health care facility    -  1    Elevated LFTs        Elevated glucose level        Obesity (BMI 30-39.9)        Hyperlipidemia LDL goal <130        Screening for thyroid disorder        Visit for screening mammogram        Screening for colon cancer          Care Instructions      *  Work on your diet.     *  Mammogram at you r convenience.     *  Colon cancer screening:     --I would recommend that you get a colonoscopy, thew best test for colon cancer screening.       --At the minimum, you should have a FIT test    Return the \"FIT\" stool card test to us via mail.  This is a basic level screening for colon cancer by detecting trace amount of occult blood in the intestinal tract.  My preference (and that of the American Cancer Society) would normally be for a full colonoscopy, but the FIT test can suffice for now.  Eventually you should have a colonoscopy.  If the FIT test shows any traces of occult blood, it does NOT necessarily mean that you have colon cancer, it just means that we should probably consider doing the colonoscopy.             Preventive Health Recommendations  Female Ages 50 - 64    Yearly exam: See your health care provider every year in order to  o Review health changes.   o Discuss preventive care.    o Review your medicines if your doctor has prescribed any.      Get a Pap test every three years (unless you have an abnormal result and your provider advises testing more often).    If you get Pap tests with HPV test, you only need to test every 5 years, unless you have an abnormal result.     You do not " need a Pap test if your uterus was removed (hysterectomy) and you have not had cancer.    You should be tested each year for STDs (sexually transmitted diseases) if you're at risk.     Have a mammogram every 1 to 2 years.    Have a colonoscopy at age 50, or have a yearly FIT test (stool test). These exams screen for colon cancer.      Have a cholesterol test every 5 years, or more often if advised.    Have a diabetes test (fasting glucose) every three years. If you are at risk for diabetes, you should have this test more often.     If you are at risk for osteoporosis (brittle bone disease), think about having a bone density scan (DEXA).    Shots: Get a flu shot each year. Get a tetanus shot every 10 years.    Nutrition:     Eat at least 5 servings of fruits and vegetables each day.    Eat whole-grain bread, whole-wheat pasta and brown rice instead of white grains and rice.    Talk to your provider about Calcium and Vitamin D.     Lifestyle    Exercise at least 150 minutes a week (30 minutes a day, 5 days a week). This will help you control your weight and prevent disease.    Limit alcohol to one drink per day.    No smoking.     Wear sunscreen to prevent skin cancer.     See your dentist every six months for an exam and cleaning.    See your eye doctor every 1 to 2 years.            Follow-ups after your visit        Future tests that were ordered for you today     Open Future Orders        Priority Expected Expires Ordered    Fecal colorectal cancer screen (FIT) Routine 6/20/2017 8/22/2017 5/30/2017    MA Screening Digital Bilateral Routine  5/30/2018 5/30/2017            Who to contact     If you have questions or need follow up information about today's clinic visit or your schedule please contact [facility] directly at [phone].  Normal or non-critical lab and imaging results will be communicated to you by MyChart, letter or phone within 4 business days after the clinic has received  "the results. If you do not hear from us within 7 days, please contact the clinic through [employer] or phone. If you have a critical or abnormal lab result, we will notify you by phone as soon as possible.  Submit refill requests through [employer] or call your pharmacy and they will forward the refill request to us. Please allow 3 business days for your refill to be completed.          Additional Information About Your Visit        [employer] Information     [employer] gives you secure access to your electronic health record. If you see a primary care provider, you can also send messages to your care team and make appointments. If you have questions, please call your primary care clinic.  If you do not have a primary care provider, please call [phone] and they will assist you.        Care EveryWhere ID     This is your Care EveryWhere ID. This could be used by other organizations to access your [place] medical records  [id number]        Your Vitals Were     Pulse Temperature Height Pulse Oximetry BMI (Body Mass Index)       60 98.2  F (36.8  C) (Oral) 4' 9.5\" (1.461 m) 99% 34.66 kg/m2        Blood Pressure from Last 3 Encounters:   05/30/17 114/68   01/23/17 122/78   05/23/16 140/76    Weight from Last 3 Encounters:   05/30/17 163 lb (73.9 kg)   01/23/17 167 lb 6.4 oz (75.9 kg)   05/23/16 169 lb 8 oz (76.9 kg)              We Performed the Following     CBC with platelets and differential     Comprehensive metabolic panel (BMP + Alb, Alk Phos, ALT, AST, Total. Bili, TP)     Hemoglobin A1c     Lipid Profile with reflex to direct LDL     TSH with free T4 reflex        Primary Care Provider Office Phone # Fax #    Des Aquino [phone]       [facility] [address] [facility] [address]        Thank you!     Thank you for choosing [facility]  for your care. Our goal is always to provide you with excellent care. Hearing back from our patients is one way " we can continue to improve our services. Please take a few minutes to complete the written survey that you may receive in the mail after your visit with us. Thank you!             Your Updated Medication List - Protect others around you: Learn how to safely use, store and throw away your medicines at www.disposemymeds.org.          This list is accurate as of: 5/30/17  9:14 AM.  Always use your most recent med list.                   Brand Name Dispense Instructions for use    GLUCOSAMINE SULFATE PO

## 2017-05-30 NOTE — PROGRESS NOTES
"Your labs looked OK with 2 exceptions.  1.   The cholesterol levels are higher than desired, but not quit higher enough to require a medication.  Make the cahnges in the diet we discussed.  We will repeat these again.     2.  The labs show that you do have \"pre-diabetes\" with the mildly elevated blood glucose and the mildly elevated A1C lab.  The hemoglobin A1C is a blood test that give an estimate of the average blodo glucose level over the preceding 3 months.  Your level with slightly elevated enough to call \"pre-diabetes\", but not actual type 2 diabetes, yet.  As we discussed today, you can prevent this from getting worse by making the diet changes I recommended, focusing on lowering the intake of \"simple carbohydrates\" (e.g. White bread, white rice, pasta, noodles, potatoes, snack foods, regular soda, juices (except fresh squeezed), cakes, cookies, candy, etc.) as much as possible.  The fewer glucose molecules that go into your mouth, the fewer end up in the blood stream.  If you make these diet change,s then there is a very good chance of never having to take any medications for diabetes.      3.  The liver tests have all returned to normal.      4.  Return to see me in 6 months, sooner if needed.  Please get fasting labs done at the [facility] or any other [facility] lab 1-2 days before this appointment.  If you get the labs done at another clinic, make arrangements with them directly.  The orders will be in place.  Eat nothing for at least 8 hours prior to having these labs drawn.  Call [phone] to schedule appointments at [facility]."

## 2017-05-30 NOTE — LETTER
[facility]  [address] [id number] [phone]        February 5, 2018    Randa Castrejon  [address]  [facility] [address]              Dear Randa Castrejon    This is to remind you that your fasting labs is due.    You may call our office at [phone] to schedule an appointment.    Please disregard this notice if you have already had your labs drawn or made an appointment.        Sincerely,        Des Aquino  [place] [facility]

## 2017-05-30 NOTE — LETTER
"[facility]  [address]  [address]  [phone]        Randa Cash  [address]  [facility] [address]      5/30/2017      Dear Ms. Randa Castrejon:    I am writing to inform you of the results of the laboratory tests you had done recently.    Total Cholesterol:   Lab Results   Component Value Date    CHOL 227 05/30/2017          (Recommended: below 200)        HDL (good) Cholesterol :   Lab Results   Component Value Date    HDL 35 05/30/2017         (Recommended: 40 or more)  LDL (bad) Cholesterol:    Lab Results   Component Value Date     05/30/2017          (Recommended: below 130, below 100 if heart disease or diabetes is diagnosed)   Triglycerides:    Lab Results   Component Value Date    TRIG 180 05/30/2017       (Recommended: below 180)  Non HDL cholesterol (Cholesterol ratio:   Lab Results   Component Value Date    CHOLHDLRATIO 5.4 03/20/2015    NHDL 192 05/30/2017     (Ideally below 130, Acceptable below 160).    Additional results of your recent labs are as noted.   Liver function: NORMAL  Kidney  function: NORMAL  Hemoglobin: NORMAL  Thyroid function: NORMAL  Electrolytes: NORMAL  Glucose: ABNORMAL 113 (normal under 100)  Hgb A1C: ABNORMAL @ 6.3 (normal under 6.0, diabetes start above 6.5)    Your labs looked OK with 2 exceptions.  1.   The cholesterol levels are higher than desired, but not quit higher enough to require a medication.  Make the cahnges in the diet we discussed.  We will repeat these again.     2.  The labs show that you do have \"pre-diabetes\" with the mildly elevated blood glucose and the mildly elevated A1C lab.  The hemoglobin A1C is a blood test that give an estimate of the average blodo glucose level over the preceding 3 months.  Your level with slightly elevated enough to call \"pre-diabetes\", but not actual type 2 diabetes, yet.  As we discussed today, you can prevent this from getting worse by " "making the diet changes I recommended, focusing on lowering the intake of \"simple carbohydrates\" (e.g. White bread, white rice, pasta, noodles, potatoes, snack foods, regular soda, juices (except fresh squeezed), cakes, cookies, candy, etc.) as much as possible.  The fewer glucose molecules that go into your mouth, the fewer end up in the blood stream.  If you make these diet changes, then there is a very good chance of never having to take any medications for diabetes.      3.  The liver tests have all returned to normal.      4.  Return to see me in 6 months to recheck all of this.  Return sooner if needed.  Please get fasting labs done at the [facility] or any other [facility] lab 1-2 days before this appointment.  If you get the labs done at another clinic, make arrangements with them directly.  The orders will be in place.  Eat nothing for at least 8 hours prior to having these labs drawn.  Call [phone] to schedule appointments at [facility].     Thank you for allowing me to participate in your care. If you have any further questions or problems, please contact me via our nurse line at [phone]    Sincerely,          Des Aquino M.D.  Department of Internal Medicine  [facility]  "

## 2017-06-12 PROCEDURE — 82274 ASSAY TEST FOR BLOOD FECAL: CPT | Performed by: INTERNAL MEDICINE

## 2017-06-13 DIAGNOSIS — Z12.11 SCREENING FOR COLON CANCER: ICD-10-CM

## 2017-06-13 LAB — HEMOCCULT STL QL IA: NEGATIVE

## 2017-06-13 NOTE — LETTER
[facility]  [address]  [address]  [phone]          Randa Castrejon  [address]  [facility] [address]      6/13/2017        Dear MsVitor Tourevalerio Cash:    I am writing to inform you of the results of the laboratory tests you had done recently.    The results of your recent labs are as noted.     The Fecal Occult Blood testing was NEGATIVE for any occult blood in your stool, which means there is a very low chance of colon cancer at this time.     Thank you for allowing me to participate in your care. If you have any further questions or problems, please contact me via our nurse line at [phone].    Sincerely,          Des Aquino M.D.  Department of Internal Medicine  [facility]

## 2017-06-13 NOTE — PROGRESS NOTES
The Fecal Occult Blood testing was NEGATIVE for any occult blood in your stool, which means there is a very low chance of colon cancer at this time.

## 2017-11-24 ENCOUNTER — RADIANT APPOINTMENT (OUTPATIENT)
Dept: MAMMOGRAPHY | Facility: CLINIC | Age: 52
End: 2017-11-24
Attending: INTERNAL MEDICINE
Payer: COMMERCIAL

## 2017-11-24 DIAGNOSIS — Z12.31 VISIT FOR SCREENING MAMMOGRAM: ICD-10-CM

## 2017-11-24 PROCEDURE — G0202 SCR MAMMO BI INCL CAD: HCPCS | Mod: TC

## 2018-03-12 ENCOUNTER — TELEPHONE (OUTPATIENT)
Dept: LAB | Facility: CLINIC | Age: 53
End: 2018-03-12

## 2019-06-28 ENCOUNTER — TRANSFERRED RECORDS (OUTPATIENT)
Dept: HEALTH INFORMATION MANAGEMENT | Facility: CLINIC | Age: 54
End: 2019-06-28

## 2019-11-08 ENCOUNTER — HEALTH MAINTENANCE LETTER (OUTPATIENT)
Age: 54
End: 2019-11-08

## 2020-02-23 ENCOUNTER — HEALTH MAINTENANCE LETTER (OUTPATIENT)
Age: 55
End: 2020-02-23

## 2020-11-02 ENCOUNTER — TRANSFERRED RECORDS (OUTPATIENT)
Dept: HEALTH INFORMATION MANAGEMENT | Facility: CLINIC | Age: 55
End: 2020-11-02

## 2020-11-11 ENCOUNTER — TRANSFERRED RECORDS (OUTPATIENT)
Dept: HEALTH INFORMATION MANAGEMENT | Facility: CLINIC | Age: 55
End: 2020-11-11

## 2020-12-06 ENCOUNTER — HEALTH MAINTENANCE LETTER (OUTPATIENT)
Age: 55
End: 2020-12-06

## 2021-09-25 ENCOUNTER — HEALTH MAINTENANCE LETTER (OUTPATIENT)
Age: 56
End: 2021-09-25

## 2022-01-15 ENCOUNTER — HEALTH MAINTENANCE LETTER (OUTPATIENT)
Age: 57
End: 2022-01-15

## 2022-03-12 ENCOUNTER — HEALTH MAINTENANCE LETTER (OUTPATIENT)
Age: 57
End: 2022-03-12

## 2022-09-29 ENCOUNTER — TRANSFERRED RECORDS (OUTPATIENT)
Dept: HEALTH INFORMATION MANAGEMENT | Facility: CLINIC | Age: 57
End: 2022-09-29

## 2022-09-30 ENCOUNTER — TRANSCRIBE ORDERS (OUTPATIENT)
Dept: OTHER | Age: 57
End: 2022-09-30

## 2022-09-30 DIAGNOSIS — K56.41: Primary | ICD-10-CM

## 2023-04-22 ENCOUNTER — HEALTH MAINTENANCE LETTER (OUTPATIENT)
Age: 58
End: 2023-04-22